# Patient Record
Sex: MALE | Race: AMERICAN INDIAN OR ALASKA NATIVE
[De-identification: names, ages, dates, MRNs, and addresses within clinical notes are randomized per-mention and may not be internally consistent; named-entity substitution may affect disease eponyms.]

---

## 2017-05-20 ENCOUNTER — HOSPITAL ENCOUNTER (INPATIENT)
Dept: HOSPITAL 5 - ED | Age: 67
LOS: 1 days | Discharge: HOME | DRG: 392 | End: 2017-05-21
Attending: INTERNAL MEDICINE | Admitting: INTERNAL MEDICINE
Payer: MEDICARE

## 2017-05-20 DIAGNOSIS — I10: ICD-10-CM

## 2017-05-20 DIAGNOSIS — Z85.46: ICD-10-CM

## 2017-05-20 DIAGNOSIS — E78.5: ICD-10-CM

## 2017-05-20 DIAGNOSIS — I25.10: ICD-10-CM

## 2017-05-20 DIAGNOSIS — Z95.5: ICD-10-CM

## 2017-05-20 DIAGNOSIS — K21.9: Primary | ICD-10-CM

## 2017-05-20 DIAGNOSIS — I24.9: ICD-10-CM

## 2017-05-20 DIAGNOSIS — Z82.49: ICD-10-CM

## 2017-05-20 LAB
ANION GAP SERPL CALC-SCNC: 17 MMOL/L
BLASTOCYTES % (MANUAL): 0 %
BUN SERPL-MCNC: 17 MG/DL (ref 9–20)
BUN/CREAT SERPL: 12.14 %
CALCIUM SERPL-MCNC: 9.5 MG/DL (ref 8.4–10.2)
CHLORIDE SERPL-SCNC: 101.6 MMOL/L (ref 98–107)
CO2 SERPL-SCNC: 27 MMOL/L (ref 22–30)
GLUCOSE SERPL-MCNC: 91 MG/DL (ref 75–100)
HCT VFR BLD CALC: 47.9 % (ref 35.5–45.6)
HGB BLD-MCNC: 15.8 GM/DL (ref 11.8–15.2)
MCH RBC QN AUTO: 29 PG (ref 28–32)
MCHC RBC AUTO-ENTMCNC: 33 % (ref 32–34)
MCV RBC AUTO: 88 FL (ref 84–94)
PLATELET # BLD: 199 K/MM3 (ref 140–440)
POTASSIUM SERPL-SCNC: 4.5 MMOL/L (ref 3.6–5)
RBC # BLD AUTO: 5.47 M/MM3 (ref 3.65–5.03)
SODIUM SERPL-SCNC: 141 MMOL/L (ref 137–145)
TOTAL CELLS COUNTED PERCENT: 22
WBC # BLD AUTO: 5.5 K/MM3 (ref 4.5–11)

## 2017-05-20 PROCEDURE — 71020: CPT

## 2017-05-20 PROCEDURE — 93306 TTE W/DOPPLER COMPLETE: CPT

## 2017-05-20 PROCEDURE — 93005 ELECTROCARDIOGRAM TRACING: CPT

## 2017-05-20 PROCEDURE — 82550 ASSAY OF CK (CPK): CPT

## 2017-05-20 PROCEDURE — 85025 COMPLETE CBC W/AUTO DIFF WBC: CPT

## 2017-05-20 PROCEDURE — 36415 COLL VENOUS BLD VENIPUNCTURE: CPT

## 2017-05-20 PROCEDURE — 96374 THER/PROPH/DIAG INJ IV PUSH: CPT

## 2017-05-20 PROCEDURE — 82553 CREATINE MB FRACTION: CPT

## 2017-05-20 PROCEDURE — 93010 ELECTROCARDIOGRAM REPORT: CPT

## 2017-05-20 PROCEDURE — 85007 BL SMEAR W/DIFF WBC COUNT: CPT

## 2017-05-20 PROCEDURE — 85379 FIBRIN DEGRADATION QUANT: CPT

## 2017-05-20 PROCEDURE — 96375 TX/PRO/DX INJ NEW DRUG ADDON: CPT

## 2017-05-20 PROCEDURE — 94640 AIRWAY INHALATION TREATMENT: CPT

## 2017-05-20 PROCEDURE — 84484 ASSAY OF TROPONIN QUANT: CPT

## 2017-05-20 PROCEDURE — 80048 BASIC METABOLIC PNL TOTAL CA: CPT

## 2017-05-20 PROCEDURE — 93017 CV STRESS TEST TRACING ONLY: CPT

## 2017-05-20 NOTE — EMERGENCY DEPARTMENT REPORT
HPI





- General


Chief Complaint: Dyspnea/Respdistress


Time Seen by Provider: 05/20/17 21:51





- HPI


HPI: 





Room 4





The patient is a 66-year-old male presenting with a chief complaint of chest 

pain shortness of breath.  The patient states this morning he became 

diaphoretic and had increased work of breathing.  Patient states he developed 

substernal chest pressure that felt like someone had a foot on his chest.  

Patient states the pressure has been constant.  Patient denies nausea or 

vomiting.  Patient does admit to an occasional cough.  The patient currently 

gives his chest pressure score of 3-4/10 only with cough





Location: Chest


Duration: One day


Quality: Pressure


Severity: 3-4/10


Modifying factors: [see above]


Context: [see above]


Mode of transportation: Unknown





ED Past Medical Hx





- Past Medical History


Hx Hypertension: Yes


Hx of Cancer: Yes


Additional medical history: prostate ca, hyperlipidemia





- Surgical History


Hx Appendectomy: Yes


Additional Surgical History: prostate ca, knee surgery





- Family History


Family history: no significant





- Social History


Smoking Status: Former Smoker


Substance Use Type: Alcohol





- Medications


Home Medications: 


 Home Medications











 Medication  Instructions  Recorded  Confirmed  Last Taken  Type


 


Aspirin 81 mg PO DAILY 05/20/17 05/20/17 05/20/17 History


 


Clopidogrel [Plavix] 75 mg PO QDAY 05/20/17 05/20/17 05/20/17 History


 


Metoprolol [Lopressor TAB] 50 mg PO BID 05/20/17 05/20/17 05/20/17 History


 


Montelukast [Singulair] 10 mg PO QPM PRN 05/20/17 05/20/17 Unknown History


 


Rosuvastatin (Nf) [Crestor] 20 mg PO QHS 05/20/17 05/20/17 05/20/17 History














ED Review of Systems


ROS: 


Stated complaint: COUGH/TROUBLE BREATHING


Other details as noted in HPI





Comment: All other systems reviewed and negative


Constitutional: diaphoresis


Eyes: denies: eye pain, eye discharge, vision change


ENT: denies: ear pain, throat pain


Respiratory: shortness of breath


Cardiovascular: chest pain


Endocrine: no symptoms reported


Gastrointestinal: denies: abdominal pain, nausea, diarrhea


Genitourinary: denies: urgency, dysuria


Musculoskeletal: denies: back pain, joint swelling, arthralgia


Skin: denies: rash, lesions


Neurological: denies: headache, weakness, paresthesias


Psychiatric: denies: anxiety, depression


Hematological/Lymphatic: denies: easy bleeding, easy bruising





Physical Exam





- Physical Exam


Vital Signs: 


 Vital Signs











  05/20/17 05/20/17 05/20/17





  15:18 20:22 20:28


 


Temperature 97.8 F  97.8 F


 


Pulse Rate 70  79


 


Pulse Rate [  60 





Anterior   





Bilateral   





Throughout]   


 


Respiratory 20  17





Rate   


 


Respiratory  16 





Rate [Anterior   





Bilateral   





Throughout]   


 


Blood Pressure 138/82  


 


Blood Pressure   137/99





[Left]   


 


O2 Sat by Pulse 98  98





Oximetry   














  05/20/17





  20:32


 


Temperature 


 


Pulse Rate 


 


Pulse Rate [ 62





Anterior 





Bilateral 





Throughout] 


 


Respiratory 





Rate 


 


Respiratory 16





Rate [Anterior 





Bilateral 





Throughout] 


 


Blood Pressure 


 


Blood Pressure 





[Left] 


 


O2 Sat by Pulse 





Oximetry 











Physical Exam: 





GENERAL: The patient is well-developed well-nourished male lying on stretcher 

not appearing to be in acute distress. []


HEENT: Normocephalic.  Atraumatic.  Extraocular motions are intact.  Patient 

has moist mucous membranes.


NECK: Supple.  No meningitic signs are noted.  There is no adenopathy noted.


CHEST/LUNGS: Mild expiratory wheezing left lungs.  There is no respiratory 

distress noted.


HEART/CARDIOVASCULAR: Regular.  There is no tachycardia.  There is no gallop 

rub or murmur.


ABDOMEN: Abdomen is soft, nontender.  Patient has normal bowel sounds.  There 

is no abdominal distention.


SKIN: There is no rash.  There is no edema.  There is no diaphoresis.


NEURO: The patient is awake, alert, and oriented.  The patient is cooperative. 

The patient has normal speech 


MUSCULOSKELETAL:  There is no evidence of acute injury.





ED Course


 Vital Signs











  05/20/17 05/20/17 05/20/17





  15:18 20:22 20:28


 


Temperature 97.8 F  97.8 F


 


Pulse Rate 70  79


 


Pulse Rate [  60 





Anterior   





Bilateral   





Throughout]   


 


Respiratory 20  17





Rate   


 


Respiratory  16 





Rate [Anterior   





Bilateral   





Throughout]   


 


Blood Pressure 138/82  


 


Blood Pressure   137/99





[Left]   


 


O2 Sat by Pulse 98  98





Oximetry   














  05/20/17





  20:32


 


Temperature 


 


Pulse Rate 


 


Pulse Rate [ 62





Anterior 





Bilateral 





Throughout] 


 


Respiratory 





Rate 


 


Respiratory 16





Rate [Anterior 





Bilateral 





Throughout] 


 


Blood Pressure 


 


Blood Pressure 





[Left] 


 


O2 Sat by Pulse 





Oximetry 














ED Medical Decision Making





- Lab Data


Result diagrams: 


 05/20/17 15:26





 05/20/17 15:26





 Laboratory Tests











  05/20/17 05/20/17





  15:26 15:26


 


WBC   5.5


 


RBC   5.47 H


 


Hgb   15.8 H


 


Hct   47.9 H


 


MCV   88


 


MCH   29


 


MCHC   33


 


RDW   14.9


 


Plt Count   199


 


Eos % (Auto)   Np


 


Add Manual Diff   Complete


 


Total Counted   100


 


Seg Neuts % (Manual)   45.0


 


Band Neutrophils %   4.0


 


Lymphocytes % (Manual)   29.0


 


Reactive Lymphs % (Man)   2.0


 


Monocytes % (Manual)   7.0


 


Eosinophils % (Manual)   12.0 H


 


Basophils % (Manual)   1.0


 


Metamyelocytes %   0


 


Myelocytes %   0


 


Promyelocytes %   0


 


Blast Cells %   0


 


Nucleated RBC %   Not Reportable


 


Seg Neutrophils # Man   2.5


 


Band Neutrophils #   0.2


 


Lymphocytes # (Manual)   1.6


 


Abs React Lymphs (Man)   0.1


 


Monocytes # (Manual)   0.4


 


Eosinophils # (Manual)   0.7 H


 


Basophils # (Manual)   0.1


 


Metamyelocytes #   0.0


 


Myelocytes #   0.0


 


Promyelocytes #   0.0


 


Blast Cells #   0.0


 


WBC Morphology   Not Reportable


 


Hypersegmented Neuts   Not Reportable


 


Hyposegmented Neuts   Not Reportable


 


Hypogranular Neuts   Not Reportable


 


Smudge Cells   Not Reportable


 


Toxic Granulation   Not Reportable


 


Toxic Vacuolation   Not Reportable


 


Dohle Bodies   Not Reportable


 


Pelger-Huet Anomaly   Not Reportable


 


Nayla Rods   Not Reportable


 


Platelet Estimate   Consistent w auto


 


Clumped Platelets   Not Reportable


 


Plt Clumps, EDTA   Not Reportable


 


Large Platelets   Not Reportable


 


Giant Platelets   Not Reportable


 


Platelet Satelliting   Not Reportable


 


Plt Morphology Comment   Not Reportable


 


RBC Morphology   Normal


 


Dimorphic RBCs   Not Reportable


 


Polychromasia   Not Reportable


 


Hypochromasia   Not Reportable


 


Poikilocytosis   Not Reportable


 


Anisocytosis   Not Reportable


 


Microcytosis   Not Reportable


 


Macrocytosis   Not Reportable


 


Spherocytes   Not Reportable


 


Pappenheimer Bodies   Not Reportable


 


Sickle Cells   Not Reportable


 


Target Cells   Not Reportable


 


Tear Drop Cells   Not Reportable


 


Ovalocytes   Not Reportable


 


Helmet Cells   Not Reportable


 


[place] Bodies   Not Reportable


 


[place] Rings   Not Reportable


 


[place] Cells   Not Reportable


 


Bite Cells   Not Reportable


 


Crenated Cell   Not Reportable


 


Elliptocytes   Not Reportable


 


Acanthocytes (Spur)   Not Reportable


 


Rouleaux   Not Reportable


 


Hemoglobin C Crystals   Not Reportable


 


Schistocytes   Not Reportable


 


Malaria parasites   Not Reportable


 


R[place]undo Bodies   Not Reportable


 


Hem Pathologist Commnt   No


 


Sodium  141 


 


Potassium  4.5 


 


Chloride  101.6 


 


Carbon Dioxide  27 


 


Anion Gap  17 


 


BUN  17 


 


Creatinine  1.4 


 


Estimated GFR  > 60 


 


BUN/Creatinine Ratio  12.14 


 


Glucose  91 


 


Calcium  9.5 


 


Troponin T  < 0.010 














- EKG Data


-: EKG Interpreted by Me


EKG shows normal: sinus rhythm


Rate: normal





- EKG Data


When compared to previous EKG there are: no significant change


Interpretation: unchanged when compared t





- Radiology Data


Radiology results: image reviewed (chest x-ray)


interpreted by me: 





Chest x-ray-no focal infiltrates, no pneumothorax





- Differential Diagnosis


ACS, pneumonia, pericarditis, GERD


Critical care attestation.: 


If time is entered above; I have spent that time in minutes in the direct care 

of this critically ill patient, excluding procedure time.








ED Disposition


Clinical Impression: 


 Chest pain





Disposition: OP ADMITTED AS IP TO THIS HOSP


Is pt being admited?: Yes


Does the pt Need Aspirin: Yes


Condition: Fair


Instructions:  Chest Pain (ED)


Referrals: 


PRIMARY CARE,MD [Primary Care Provider] - 3-5 Days


Time of Disposition: 22:14 (hospitalist notified)

## 2017-05-20 NOTE — ADMIT CRITERIA FORM
Admission Criteria Documentation: 





                                                CHEST PAIN





Clinical Indications for Admission to Inpatient Care





                                                                         (Place 

'X' for any and all applicable criteria): 





Admission is indicated for chest pain and ANY ONE of the following(1)(2)(3)(4)(5

): 





[ ]I.    Angina with acute coronary syndrome (Also use Myocardial Infarction or 

Angina guideline)


[ ]II.   Hemodynamic instability


[ ]III.  Angina needing acute intervention as indicated by ALL of the following(

11)(12):


        [ ]a)  Unstable angina is present as indicated by angina that is ANY ONE

 of the following:


                [ ]i)     New onset   


                [ ]ii)    Nocturnal   


                [ ]iii)   Prolonged at rest   


                [ ]iv)   Progressive


        [ ]b)  Angina warrants acute intervention as indicated by ANY ONE of 

the following:


                [ ]i)     Recurrent angina (e.g, not responding as previously 

to treatment)


                [ ]ii)     Angina at rest or with low-level activities despite 

initial medical therapy


                [ ]iii)    New or presumably new ST-segment depression on ECG


                [ ]iv)    Signs or symptoms of heart failure (eg, dyspnea, 

pulmonary edema)


                [ ]v)     New or worsening mitral regurgitation


                [ ]vi)    Hemodynamic instability


                [ ]vii)   Dangerous arrhythmia (eg, sustained ventricular 

tachycardia)          


                [ ]viii)   History of percutaneous coronary intervention within 

6 months          


                [ ]ix)    History of coronary artery bypass graft surgery


                [ ]x)    CHARBEL risk score of 2 or greater[A]


                [ ]xi)    History of Diabetes(14)


                [ ]xii)   High-risk cardiac ischemia findings on noninvasive 

testing (e.g, echocardiogram,


                          treadmill testing, nuclear scan)


                [ ]xiii)  Chronic renal insufficiency (ie, estimated GFR less 

than 60 mL/min/1.732m)


                [ ]xiv)  Left ventricular ejection fraction less than 40%


              


[ ]IV.   Evidence of MI (eg, cardiac biomarkers positive, ST-segment elevation 

on ECG) also use Myocardial Infarction Criteria Form.


[ ]V.    Pulmonary edema 


[ X]VI.   Respiratory distress


[ ]VII.  Chest pain indicative of serious diagnosis other than coronary artery 

disease (eg, aortic dissection)





[ ]VIII. Contraindications and/or Inappropriate clinical situations for 

Observational Care in patients


          with Chest Pain, when ANY ONE of the following is required:


          [ ]a)   Patient with risk factor for pulmonary embolism, acute 

coronary syndrome and myocardial infarction (18)


          [ ]b)   Patient with Pulmonary embolism require an average LOS of 4.3 

days, therefore emergency 


                   department observation management is inappropriate 18,23


          [ ]c)   Painful condition/s in the elderly, have the highest rate of 

recidivism after emergency department observation management (10.8%)  20,21,22


          [ ]d)   Elevated cardiac biomarker requires intensive and exhaustive 

care (19)


[ ]IX.  General contraindications and/or Inappropriate clinical situations for 

Observational Care in patients


          with Chest Pain, when ANY ONE of the following is required:


           [ ]a)   Prediction of prolongation of LOS based on ANY ONE of the 

following may be considered as 


                    a contraindication for observational care 2, 3, 4, 5, 6, 7, 

8, 9, 10, 11


                    [ ]i)    Age > 65 yrs.


                    [ ]ii)   Patient arriving by ambulance


                    [ ]iii)  Patient with high acuity


                    [ ]iv)  Patient requiring vital sign monitoring


                    [ ]v)   Patient on IV medication


           [ ]b)   Systolic blood pressures  180mmHg  3,12


           [ ]c)   Patient with altered mental status including delirium and 

other alteration of consciousness, (3)


           [ ]d)   Patient whose discharge disposition will be to a skilled 

nursing home or rehabilitation home should 


                    not be managed in Emergency Department Observation Unit. 

CMS rule requires 3 days hospital stay before such placement. 3,13


           [ ]e)   Patient with failure to thrive due to broad array of 

etiologies  3,16,17


           [ ]f)    Inability to ambulate  3,14








Extended stay beyond goal length of stay may be needed for (1)(28):


[ ]a)   Specific condition diagnosed after evaluation (eg, pulmonary embolism, 

aortic dissection) 


[ ]b)   Unstable angina


[ ]c)   Continued suspicion of acute coronary syndrome with inability to 

complete needed cardiac evaluation


         (eg, patient clinically unable to undergo stress testing)


[ ]d)   Myocardial infarction








(Contents from ANGINA and CHEST PAIN clinical indications for admission to 

inpatient care have been integrated in this form) 








The original [employer] content created by [employer] has been revised. 


The portions of the content which have been revised are identified through the 

use of italic text or in bold, and [employer] [facility] [employer]


has neither reviewed nor approved the modified material. All other unmodified 

content is copyright  [employer].





Please see references footnoted in the original [facility] edition 

2016

## 2017-05-20 NOTE — HISTORY AND PHYSICAL REPORT
History of Present Illness


Chief complaint: 


My chest hurts me


History of present illness: 


65 YO Male with HTN, HLD, CAD S/P PCI to RCA, CaP, presents to ED for 

evaluation. Pt states that he has been experiencing pain is his chest for the 

past day, with worsening symptoms for the past 3 hours. Pt states that pain is 4

/10, substernal, and feels like "someone has a foot on his chest", constant, 

not worsened with exertion, or relieved with rest, no association with meals. 

Pt acknowledges shortness of breath for the past 6 hours. Pt denies fever, 

chills, unintentional weight loss, night sweats, prolonged travel/immobility, 

individual/family history of DVT/PE, trauma, syncope, hemoptysis, productive 

cough, orthopnea, PND, recent ill contacts. 





Past History


Past Medical History: CAD, cancer, hypertension, hyperlipidemia


Past Surgical History: appendectomy, Other (Knee surgery, prostate surgery)


Social history: , lives with family.  denies: smoking, alcohol abuse, 

prescription drug abuse


Family history: CAD, hypertension





Medications and Allergies


 Allergies











Allergy/AdvReac Type Severity Reaction Status Date / Time


 


No Known Allergies Allergy   Unverified 05/10/14 12:59











 Home Medications











 Medication  Instructions  Recorded  Confirmed  Last Taken  Type


 


Aspirin 81 mg PO DAILY 05/20/17 05/20/17 05/20/17 History


 


Clopidogrel [Plavix] 75 mg PO QDAY 05/20/17 05/20/17 05/20/17 History


 


Metoprolol [Lopressor TAB] 50 mg PO BID 05/20/17 05/20/17 05/20/17 History


 


Montelukast [Singulair] 10 mg PO QPM PRN 05/20/17 05/20/17 Unknown History


 


Rosuvastatin (Nf) [Crestor] 20 mg PO QHS 05/20/17 05/20/17 05/20/17 History











Active Meds: 


Active Medications





Acetaminophen (Tylenol)  650 mg PO Q4H PRN


   PRN Reason: Pain MILD(1-3)/Fever >100.5/HA


Albuterol/Ipratropium (Duoneb 0.5 Mg-3 Mg/3 Ml Soln)  1 ampul IH Q6HRT PRN


   PRN Reason: Wheezing


Bisacodyl (Dulcolax)  10 mg IN QDAY PRN


   PRN Reason: Constipation unrelieved by MOM


Magnesium Hydroxide (Milk Of Magnesia)  30 ml PO Q4H PRN


   PRN Reason: Constipation


Ondansetron HCl (Zofran)  4 mg IV Q8H PRN


   PRN Reason: N/V unrelieved by Reglan


Sodium Chloride (Sodium Chloride Flush Syringe 10 Ml)  10 ml IV PRN PRN


   PRN Reason: LINE FLUSH











Review of Systems


All systems: negative


Cardiovascular: chest pain





Exam





- Constitutional


Vitals: 


 











Temp Pulse Resp BP Pulse Ox


 


 97.8 F   62   16   137/99   98 


 


 05/20/17 20:28  05/20/17 20:32  05/20/17 20:32  05/20/17 20:28  05/20/17 20:28











General appearance: Present: no acute distress, well-nourished





- EENT


Eyes: Present: PERRL


ENT: hearing intact, clear oral mucosa





- Neck


Neck: Present: supple, normal ROM





- Respiratory


Respiratory effort: normal


Respiratory: bilateral: CTA





- Cardiovascular


Heart Sounds: Present: S1 & S2.  Absent: rub, click





- Extremities


Extremities: pulses symmetrical, No edema


Peripheral Pulses: within normal limits





- Abdominal


General gastrointestinal: Present: soft, non-tender, non-distended, normal 

bowel sounds


Male genitourinary: Present: normal





- Integumentary


Integumentary: Present: clear, warm, dry





- Musculoskeletal


Musculoskeletal: gait normal, strength equal bilaterally





- Psychiatric


Psychiatric: appropriate mood/affect, intact judgment & insight





- Neurologic


Neurologic: CNII-XII intact, moves all extremities





Results





- Labs


CBC & Chem 7: 


 05/20/17 15:26





 05/20/17 15:26


Labs: 


 Abnormal lab results











  05/20/17 Range/Units





  15:26 


 


RBC  5.47 H  (3.65-5.03)  M/mm3


 


Hgb  15.8 H  (11.8-15.2)  gm/dl


 


Hct  47.9 H  (35.5-45.6)  %


 


Eosinophils % (Manual)  12.0 H  (0.0-4.3)  %


 


Eosinophils # (Manual)  0.7 H  (0.0-0.4)  K/mm3














Assessment and Plan





- Patient Problems


(1) ACS (acute coronary syndrome)


Current Visit: Yes   Status: Acute   


Plan to address problem: 


Serial cardiac enzymes, ekg, telemetry, D dimer, Echo, stress test, cardiology 

consulted








(2) HTN (hypertension)


Current Visit: Yes   Status: Acute   


Qualifiers: 


   Hypertension type: H 


Plan to address problem: 


controlled, resume home medication, monitor bp q shift








(3) HLD (hyperlipidemia)


Current Visit: Yes   Status: Acute   


Qualifiers: 


   Hyperlipidemia type: H 


Plan to address problem: 


Resume statin therapy








(4) CAD (coronary artery disease)


Current Visit: Yes   Status: Acute   


Qualifiers: 


   Coronary Disease-Associated Artery/Lesion type: C   Native vs. transplanted 

heart: N   Associated angina: A 


Plan to address problem: 


Serial cardiac enzymes, ekg, telemetry, cardiology consulted, DAPT, supportive 

care. 








(5) Prostate cancer


Current Visit: Yes   Status: Acute   


Plan to address problem: 


continue current care, 








(6) DVT prophylaxis


Current Visit: Yes   Status: Acute

## 2017-05-21 VITALS — SYSTOLIC BLOOD PRESSURE: 118 MMHG | DIASTOLIC BLOOD PRESSURE: 75 MMHG

## 2017-05-21 LAB
CK SERPL-CCNC: 206 UNITS/L (ref 55–170)
CK SERPL-CCNC: 229 UNITS/L (ref 55–170)

## 2017-05-21 NOTE — CONSULTATION
History of Present Illness


Consult date: 05/21/17


Consult reason: chest pain


History of present illness: 





Seen and evaluated in stress lab





Impression





Atypical chest pain


Known CAD s/p RCA stent 2014


Hyperlipidemia


HTN





Plan


He was unable to lay under nuclear camera


Exercise TMST, he had good exercise tolerance over 8 min


he had no chest pain or ECG changes, he only reached about


77% on max HR due to chronotropic incompetence from B-blockers.





Based on test, recommend continuing medical therapy, no further


inpt Cardiology eval recommended.


will sign off.





Past History


Past Medical History: CAD, cancer, hypertension, hyperlipidemia


Past Surgical History: appendectomy, Other (Knee surgery, prostate surgery)


Social history: , lives with family.  denies: smoking, alcohol abuse, 

prescription drug abuse


Family history: CAD, hypertension





Medications and Allergies


 Allergies











Allergy/AdvReac Type Severity Reaction Status Date / Time


 


No Known Allergies Allergy   Unverified 05/10/14 12:59











 Home Medications











 Medication  Instructions  Recorded  Confirmed  Last Taken  Type


 


Aspirin 81 mg PO DAILY 05/20/17 05/20/17 05/20/17 History


 


Clopidogrel [Plavix] 75 mg PO QDAY 05/20/17 05/20/17 05/20/17 History


 


Metoprolol [Lopressor TAB] 50 mg PO BID 05/20/17 05/20/17 05/20/17 History


 


Montelukast [Singulair] 10 mg PO QPM PRN 05/20/17 05/20/17 Unknown History


 


Rosuvastatin (Nf) [Crestor] 20 mg PO QHS 05/20/17 05/20/17 05/20/17 History











Active Meds: 


Active Medications





Acetaminophen (Tylenol)  650 mg PO Q4H PRN


   PRN Reason: Pain MILD(1-3)/Fever >100.5/HA


Albuterol (Proventil)  2.5 mg IH Q6HRT PRN


   PRN Reason: Shortness Of Breath


   Last Admin: 05/21/17 05:40 Dose:  2.5 mg


Aspirin (Baby Aspirin)  81 mg PO DAILY [facility]


Atorvastatin Calcium (Lipitor)  40 mg PO QHS CAITLYN


Bisacodyl (Dulcolax)  10 mg NC QDAY PRN


   PRN Reason: Constipation unrelieved by MOM


Clopidogrel Bisulfate (Plavix)  75 mg PO QDAY [facility]LYN


Guaifenesin (Robitussin Dm)  10 ml PO Q4H PRN


   PRN Reason: Cough


   Last Admin: 05/21/17 01:34 Dose:  10 ml


Magnesium Hydroxide (Milk Of Magnesia)  30 ml PO Q4H PRN


   PRN Reason: Constipation


Metoprolol Tartrate (Lopressor)  50 mg PO BID [facility]LYN


Montelukast Sodium (Singulair)  10 mg PO QPM [facility]LYN


Ondansetron HCl (Zofran)  4 mg IV Q8H PRN


   PRN Reason: N/V unrelieved by Reglan


Sodium Chloride (Sodium Chloride Flush Syringe 10 Ml)  10 ml IV PRN PRN


   PRN Reason: LINE FLUSH











Review of Systems


All systems: negative (in impression)





Physical Examination


 Vital Signs











Temp Pulse Resp BP Pulse Ox


 


 97.8 F   70   20   138/82   98 


 


 05/20/17 15:18  05/20/17 15:18  05/20/17 15:18  05/20/17 15:18  05/20/17 15:18











General appearance: no acute distress


HEENT: Positive: PERRL


Neck: Positive: neck supple


Cardiac: Positive: Reg Rate and Rhythm, S1/S2


Lungs: Positive: Normal Exam


Neuro: Positive: Grossly Intact


Abdomen: Positive: Unremarkable, Soft





Results





 05/20/17 15:26





 05/20/17 15:26


 Cardiac Enzymes











  05/21/17 05/21/17 Range/Units





  02:01 03:28 


 


CK-MB (CK-2)  2.2  2.1  (0.0-4.0)  ng/mL

## 2017-05-21 NOTE — DISCHARGE SUMMARY
Providers





- Providers


Date of Admission: 


05/20/17 22:24





Date of discharge: 05/21/17


Attending physician: 


OSMANY YEPEZ





Primary care physician: 


PRIMARY CARE MD








Hospitalization


Condition: Fair


Hospital course: 





65 YO Male with HTN, HLD, CAD S/P PCI to RCA, CaP, presents to ED for 

evaluation. Pt states that he has been experiencing pain is his chest for the 

past day, with worsening symptoms for the past 3 hours. 





Discahrge Diagnosis:


Atypical chest pain, likely GERD


h/o CAD s/p RCA stent 2014


Hyperlipidemia


HTN, controlled








Disposition: DISCHARGED TO HOME OR SELFCARE


Time spent for discharge: 32 minutes





Core Measure Documentation





- Palliative Care


Palliative Care/ Comfort Measures: Not Applicable





- Core Measures


Any of the following diagnoses?: none





Exam





- Constitutional


Vitals: 


 











Temp Pulse Resp BP Pulse Ox


 


 97.6 F   62   20   118/75   97 


 


 05/21/17 11:02  05/21/17 11:02  05/21/17 11:02  05/21/17 11:02  05/21/17 12:23











General appearance: Present: no acute distress, well-nourished





- EENT


Eyes: Present: PERRL


ENT: hearing intact, clear oral mucosa





- Neck


Neck: Present: supple, normal ROM





- Respiratory


Respiratory effort: normal


Respiratory: bilateral: CTA





- Cardiovascular


Heart Sounds: Present: S1 & S2.  Absent: rub, click





- Extremities


Extremities: pulses symmetrical, No edema


Peripheral Pulses: within normal limits





- Abdominal


General gastrointestinal: Present: soft, non-tender, non-distended, normal 

bowel sounds





- Integumentary


Integumentary: Present: clear, warm, dry





- Musculoskeletal


Musculoskeletal: gait normal, strength equal bilaterally





- Psychiatric


Psychiatric: appropriate mood/affect, intact judgment & insight





- Neurologic


Neurologic: CNII-XII intact, moves all extremities





Plan


Activity: advance as tolerated


Weight Bearing Status: Non-Weight Bearing


Diet: low cholesterol, low salt


Follow up with: 


PRIMARY CARE,MD [Primary Care Provider] - 3-5 Days


Prescriptions: 


Pantoprazole [Protonix] 40 mg PO QDAY #30 tablet

## 2017-05-21 NOTE — XRAY REPORT
CHEST TWO VIEWS: 05/20/17 15:16:00



CLINICAL: Shortness of breath.



COMPARISON: 05/11/14



FINDINGS: Normal heart and pulmonary vasculature.  The lungs are 

normally expanded and clear.  Aortic ectasia and tortuosity is 

unchanged compared to the prior exam.The bones and soft tissues are 

normal.



IMPRESSION: Atherosclerotic/hypertensive changes in the aorta.No acute 

cardiopulmonary process.

## 2022-08-09 ENCOUNTER — HOSPITAL ENCOUNTER (EMERGENCY)
Dept: HOSPITAL 5 - ED | Age: 72
LOS: 1 days | Discharge: LEFT BEFORE DISCHARGE | End: 2022-08-10
Payer: MEDICARE

## 2022-08-09 VITALS — DIASTOLIC BLOOD PRESSURE: 76 MMHG | SYSTOLIC BLOOD PRESSURE: 131 MMHG

## 2022-08-09 DIAGNOSIS — Z87.891: ICD-10-CM

## 2022-08-09 DIAGNOSIS — R06.02: ICD-10-CM

## 2022-08-09 DIAGNOSIS — I10: ICD-10-CM

## 2022-08-09 DIAGNOSIS — J45.901: Primary | ICD-10-CM

## 2022-08-09 DIAGNOSIS — J06.9: ICD-10-CM

## 2022-08-09 PROCEDURE — 71045 X-RAY EXAM CHEST 1 VIEW: CPT

## 2022-08-09 PROCEDURE — 99283 EMERGENCY DEPT VISIT LOW MDM: CPT

## 2022-08-09 PROCEDURE — 93005 ELECTROCARDIOGRAM TRACING: CPT

## 2022-08-09 PROCEDURE — 94640 AIRWAY INHALATION TREATMENT: CPT

## 2022-08-09 PROCEDURE — 96372 THER/PROPH/DIAG INJ SC/IM: CPT

## 2022-08-10 NOTE — EMERGENCY DEPARTMENT REPORT
- General


Chief Complaint: Adult Asthma


Stated Complaint: ASTHMA ATTACK


Source: patient, family


Mode of arrival: Ambulatory


Limitations: No Limitations





- History of Present Illness


Initial Comments: 





Patient is a 71-year-old -American male with a history of hypertension 

and asthma who presents to the ED with complaint of acute exacerbation of his 

chronic asthma characterized by shortness of breath, wheezing, dry cough, nasal 

and sinus congestion for the last 1 week, worse in the last 3 days.  Patient 

states that he has been using his albuterol inhaler at home with no relief.  

Patient states that his symptoms are consistent with his chronic asthma 

exacerbation.  Patient denies dizziness, syncope, chest pain, abdominal pain, 

fever, chills, cough, nausea and vomiting, diarrhea, dizziness, syncope, fall, 

traumatic injury, hemoptysis, sore throat, headache, palpitations or 

diaphoresis.


MD Complaint: cough, nasal congestion, other (Shortness of breath and wheezing)


-: week(s) (1)


Severity: moderate


Quality: other (Chest tightness)


Consistency: constant


Improves With: nothing


Worsens With: nothing


Context: other (Asthma)


Associated Symptoms: denies other symptoms, rhinorrhea, nasal congestion, cough,

shortness of breath.  denies: fever, chills, diaphoresis, sore throat, chest 

pain, abdominal pain, nausea, vomiting, diarrhea, dysuria, rash, right sweats, 

weight loss, epistaxis, ear pain


Treatments Prior to Arrival: none





- Related Data


                                Home Medications











 Medication  Instructions  Recorded  Confirmed  Last Taken


 


Aspirin 81 mg PO DAILY 17


 


Clopidogrel [Plavix] 75 mg PO QDAY 17


 


Metoprolol [Lopressor TAB] 50 mg PO BID 17


 


Montelukast [Singulair] 10 mg PO QPM PRN 17 Unknown


 


Rosuvastatin (Nf) [Crestor] 20 mg PO QHS 17








                                  Previous Rx's











 Medication  Instructions  Recorded  Last Taken  Type


 


Pantoprazole [Protonix] 40 mg PO QDAY #30 tablet 17 Unknown Rx











                                    Allergies











Allergy/AdvReac Type Severity Reaction Status Date / Time


 


No Known Allergies Allergy   Unverified 05/10/14 12:59














ED Review of Systems


ROS: 


Stated complaint: ASTHMA ATTACK


Other details as noted in HPI





Constitutional: denies: chills, fever


Eyes: denies: eye pain, eye discharge, vision change


ENT: denies: ear pain, throat pain


Respiratory: cough, shortness of breath, wheezing


Cardiovascular: other (Chest tightness).  denies: chest pain, palpitations


Endocrine: no symptoms reported


Gastrointestinal: denies: abdominal pain, nausea, vomiting, diarrhea, 

constipation, hematemesis


Genitourinary: denies: urgency, dysuria, frequency, hematuria, discharge, 

testicular pain, testicular mass


Musculoskeletal: denies: back pain, joint swelling, arthralgia


Skin: denies: rash, lesions


Neurological: denies: headache, weakness, paresthesias


Psychiatric: denies: anxiety, depression


Hematological/Lymphatic: denies: easy bleeding, easy bruising





ED Past Medical Hx





- Past Medical History


Hx Hypertension: Yes


Hx Congestive Heart Failure: No


Hx Diabetes: No


Hx Asthma: Yes


Hx COPD: No


Additional medical history: prostate ca, hyperlipidemia





- Surgical History


Hx Coronary Stent: Yes


Hx Appendectomy: Yes


Additional Surgical History: prostate ca, knee surgery





- Social History


Smoking Status: Former Smoker





- Medications


Home Medications: 


                                Home Medications











 Medication  Instructions  Recorded  Confirmed  Last Taken  Type


 


Aspirin 81 mg PO DAILY 17 History


 


Clopidogrel [Plavix] 75 mg PO QDAY 17 History


 


Metoprolol [Lopressor TAB] 50 mg PO BID 17 History


 


Montelukast [Singulair] 10 mg PO QPM PRN 17 Unknown History


 


Rosuvastatin (Nf) [Crestor] 20 mg PO QHS 17 History


 


Pantoprazole [Protonix] 40 mg PO QDAY #30 tablet 17  Unknown Rx














ED Physical Exam





- General


Limitations: No Limitations


General appearance: alert, in no apparent distress





- Head


Head exam: Present: atraumatic, normocephalic, normal inspection





- Eye


Eye exam: Present: normal appearance, PERRL, EOMI


Pupils: Present: normal accommodation





- ENT


ENT exam: Present: normal exam, normal orophraynx, mucous membranes moist, TM's 

normal bilaterally, normal external ear exam





- Neck


Neck exam: Present: normal inspection, full ROM.  Absent: tenderness





- Respiratory


Respiratory exam: Present: wheezes (Mildly diffuse coarse wheezes throughout).  

Absent: normal lung sounds bilaterally, respiratory distress, rales, rhonchi, 

chest wall tenderness, accessory muscle use, decreased breath sounds, prolonged 

expiratory





- Cardiovascular


Cardiovascular Exam: Present: regular rate, normal rhythm, normal heart sounds. 

 Absent: systolic murmur, diastolic murmur, rubs, gallop





- GI/Abdominal


GI/Abdominal exam: Present: soft, normal bowel sounds.  Absent: tenderness, 

guarding, rebound, hyperactive bowel sounds, hypoactive bowel sounds, mass





- Extremities Exam


Extremities exam: Present: normal inspection, full ROM, normal capillary refill





- Back Exam


Back exam: Present: normal inspection, full ROM.  Absent: tenderness, CVA 

tenderness (R), CVA tenderness (L), muscle spasm, paraspinal tenderness, 

vertebral tenderness





- Neurological Exam


Neurological exam: Present: alert, oriented X3, CN II-XII intact, normal gait, 

reflexes normal





- Psychiatric


Psychiatric exam: Present: normal affect, normal mood





- Skin


Skin exam: Present: warm, dry, intact, normal color.  Absent: rash





ED Course





                                   Vital Signs











  22





  19:01


 


Temperature 98.1 F


 


Pulse Rate 68


 


Respiratory 18





Rate 


 


Blood Pressure 131/76





[Right] 


 


O2 Sat by Pulse 98





Oximetry 














ED Medical Decision Making





- Radiology Data


Radiology results: report reviewed, image reviewed





[address] [address]  


 


                                            XRay Report   


                                               Signed  


 


Patient: HARMAN HUYNH                                                  

              MR#: M  


[id number]          


: 1950                                                                

Acct:[id number]      


 


Age/Sex: 71 / M                                                                

ADM Date: 22     


 


Loc: ED       


Attending Dr:   


 


 


Ordering Physician: MARILOU SALCIDO  


Date of Service: 08/10/22  


Procedure(s): XR chest 1V ap  


Accession Number(s): [id number]  


 


cc: MARILOU SALCIDO   


 


Fluoro Time In Minutes:   


 


XR chest 1V ap  


 


 INDICATION / CLINICAL INFORMATION: dyspnea, cough, asthma.  


 


 COMPARISON: 2017  


 


 FINDINGS:  


 


 SUPPORT DEVICES: None.  


 HEART /PULMONARY VASCULATURE: No significant abnormality.   


 LUNGS / PLEURA: No significant pulmonary or pleural abnormality. No 

pneumothorax.   


 


 ADDITIONAL FINDINGS: No significant additional findings.  


 


 IMPRESSION:  


 1. No acute findings.  


 


 Signer Name: Harman Calix MD   


 Signed: 8/10/2022 4:06 AM  


 Workstation Name: [email or web address]   


 


 


Transcribed By: LADY  


Dictated By: HARMAN CALIX MD  


Electronically Authenticated By: HARMAN CALIX MD    


Signed Date/Time: 08/10/22 0406                                


 


 


 


DD/DT: 08/10/22 0347                                                            

  


TD/TT:








- Medical Decision Making





This is a 71-year-old -American male with a history of hypertension and 

asthma who presents to the ED with complaint of acute exacerbation of his 

chronic asthma characterized by shortness of breath, wheezing, dry cough, nasal 

and sinus congestion for the last 1 week, worse in the last 3 days.  Patient 

states that he has been using his albuterol inhaler at home with no relief.  

Patient states that his symptoms are consistent with his chronic asthma 

exacerbation.  In the ED, patient is alert and oriented x3 and is not in any 

distress.  Patient is hemodynamically stable.  Patient was treated in the ED 

with DuoNeb for 1 hour, Solu-Medrol 125 mg intramuscular injection.  Chest x-ray

 showed no acute cardiopulmonary abnormalities or pneumonitis.  On reevaluation,

 patient is hemodynamically stable with oxygen saturation ranging from 98% to 

100% on room air.  Patient however walked out and left the ER AGAINST MEDICAL 

ADVICE prior to being discharged.





- Differential Diagnosis


Asthma; bronchitis; pneumonia; URI; COVID-19


Critical care attestation.: 


If time is entered above; I have spent that time in minutes in the direct care 

of this critically ill patient, excluding procedure time.








ED Disposition


Clinical Impression: 


 Acute bronchitis with asthma with acute exacerbation, Shortness of breath, 

Acute upper respiratory infection





Disposition: 07 LEFT AGAINST MEDICAL ADVICE


Is pt being admited?: No


Does the pt Need Aspirin: No


Condition: Undetermined


Instructions:  Shortness of Breath, Adult, Easy-to-Read, Upper Respiratory 

Infection, Adult, Easy-to-Read, Cough, Adult, Easy-to-Read, Acute Bronchitis, 

Adult, Easy-to-Read, Asthma, Adult, Easy-to-Read


Additional Instructions: 


Chest x-ray showed no acute cardiopulmonary abnormalities or pneumonitis.  

Therefore take medication with food, drink plenty of fluids, follow-up with your

 primary care physician in 7 to 10 days for reevaluation.  Return to the ED 

immediately if symptoms get worse.


Referrals: 


[facility] [Provider Group] - 3-5 Days


Time of Disposition: 07:14


Print Language: ENGLISH

## 2022-08-10 NOTE — XRAY REPORT
XR chest 1V ap



INDICATION / CLINICAL INFORMATION: dyspnea, cough, asthma.



COMPARISON: 5/20/2017



FINDINGS:



SUPPORT DEVICES: None.

HEART /PULMONARY VASCULATURE: No significant abnormality. 

LUNGS / PLEURA: No significant pulmonary or pleural abnormality. No pneumothorax. 



ADDITIONAL FINDINGS: No significant additional findings.



IMPRESSION:

1. No acute findings.



Signer Name: Valdemar Calix MD 

Signed: 8/10/2022 4:06 AM

Workstation Name: [id number]

## 2022-08-10 NOTE — ELECTROCARDIOGRAPH REPORT
[facility]

                                       

Test Date:    2022               Test Time:    19:10:46

Pat Name:     HARMAN HUYNH          Department:   

Patient ID:   [id number]          Room:          

Gender:       M                        Technician:   MEERA

:          1950               Requested By: KALIN HOFFMAN

Order Number: [id number]             Reading MD:   Charanjit Purvis

                                 Measurements

Intervals                              Axis          

Rate:         70                       P:            63

ND:           166                      QRS:          -6

QRSD:         87                       T:            39

QT:           372                                    

QTc:          401                                    

                           Interpretive Statements

Sinus rhythm

No previous ECG available for comparison

Electronically Signed On 8- 11:13:44 EDT by Charanjit Purvis